# Patient Record
Sex: FEMALE | Race: BLACK OR AFRICAN AMERICAN | Employment: OTHER | URBAN - METROPOLITAN AREA
[De-identification: names, ages, dates, MRNs, and addresses within clinical notes are randomized per-mention and may not be internally consistent; named-entity substitution may affect disease eponyms.]

---

## 2018-08-11 ENCOUNTER — HOSPITAL ENCOUNTER (OUTPATIENT)
Dept: MAMMOGRAPHY | Age: 68
Discharge: HOME | End: 2018-08-13
Payer: COMMERCIAL

## 2018-08-11 ENCOUNTER — HOSPITAL ENCOUNTER (OUTPATIENT)
Dept: ULTRASOUND IMAGING | Age: 68
Discharge: HOME | End: 2018-08-13
Payer: COMMERCIAL

## 2018-08-11 DIAGNOSIS — Z13.820 SCREENING FOR OSTEOPOROSIS: ICD-10-CM

## 2018-08-11 DIAGNOSIS — N95.0 POSTMENOPAUSAL BLEEDING: ICD-10-CM

## 2018-08-11 DIAGNOSIS — Z12.31 VISIT FOR SCREENING MAMMOGRAM: ICD-10-CM

## 2018-08-11 PROCEDURE — 76856 US EXAM PELVIC COMPLETE: CPT

## 2018-08-11 PROCEDURE — 77067 SCR MAMMO BI INCL CAD: CPT

## 2018-08-11 PROCEDURE — 77080 DXA BONE DENSITY AXIAL: CPT

## 2023-02-03 ENCOUNTER — HOSPITAL ENCOUNTER (OUTPATIENT)
Dept: ULTRASOUND IMAGING | Age: 73
End: 2023-02-03
Payer: MEDICARE

## 2023-02-03 DIAGNOSIS — N85.2 HYPERTROPHY OF UTERUS: ICD-10-CM

## 2023-02-03 PROCEDURE — 76830 TRANSVAGINAL US NON-OB: CPT

## 2023-05-22 ENCOUNTER — ANESTHESIA EVENT (OUTPATIENT)
Dept: OPERATING ROOM | Age: 73
End: 2023-05-22
Payer: MEDICARE

## 2023-05-22 ENCOUNTER — ANESTHESIA (OUTPATIENT)
Dept: OPERATING ROOM | Age: 73
End: 2023-05-22
Payer: MEDICARE

## 2023-05-22 ENCOUNTER — HOSPITAL ENCOUNTER (OUTPATIENT)
Age: 73
Setting detail: OUTPATIENT SURGERY
Discharge: HOME | End: 2023-05-22
Attending: SURGERY | Admitting: SURGERY
Payer: MEDICARE

## 2023-05-22 VITALS
HEART RATE: 56 BPM | DIASTOLIC BLOOD PRESSURE: 73 MMHG | HEIGHT: 63 IN | BODY MASS INDEX: 47.13 KG/M2 | OXYGEN SATURATION: 97 % | WEIGHT: 266 LBS | TEMPERATURE: 97.5 F | SYSTOLIC BLOOD PRESSURE: 161 MMHG | RESPIRATION RATE: 16 BRPM

## 2023-05-22 DIAGNOSIS — Z86.010 HX OF COLONIC POLYPS: ICD-10-CM

## 2023-05-22 DIAGNOSIS — Z12.11 SCREENING FOR COLON CANCER: ICD-10-CM

## 2023-05-22 PROCEDURE — 2500000003 HC RX 250 WO HCPCS: Performed by: NURSE ANESTHETIST, CERTIFIED REGISTERED

## 2023-05-22 PROCEDURE — 88305 TISSUE EXAM BY PATHOLOGIST: CPT

## 2023-05-22 PROCEDURE — 6360000002 HC RX W HCPCS: Performed by: NURSE ANESTHETIST, CERTIFIED REGISTERED

## 2023-05-22 PROCEDURE — 3609010300 HC COLONOSCOPY W/BIOPSY SINGLE/MULTIPLE: Performed by: SURGERY

## 2023-05-22 PROCEDURE — 3700000000 HC ANESTHESIA ATTENDED CARE: Performed by: SURGERY

## 2023-05-22 PROCEDURE — 3700000001 HC ADD 15 MINUTES (ANESTHESIA): Performed by: SURGERY

## 2023-05-22 PROCEDURE — 2580000003 HC RX 258: Performed by: STUDENT IN AN ORGANIZED HEALTH CARE EDUCATION/TRAINING PROGRAM

## 2023-05-22 PROCEDURE — 7100000011 HC PHASE II RECOVERY - ADDTL 15 MIN: Performed by: SURGERY

## 2023-05-22 PROCEDURE — 7100000010 HC PHASE II RECOVERY - FIRST 15 MIN: Performed by: SURGERY

## 2023-05-22 PROCEDURE — 2709999900 HC NON-CHARGEABLE SUPPLY: Performed by: SURGERY

## 2023-05-22 RX ORDER — SODIUM CHLORIDE, SODIUM LACTATE, POTASSIUM CHLORIDE, CALCIUM CHLORIDE 600; 310; 30; 20 MG/100ML; MG/100ML; MG/100ML; MG/100ML
INJECTION, SOLUTION INTRAVENOUS CONTINUOUS
Status: DISCONTINUED | OUTPATIENT
Start: 2023-05-22 | End: 2023-05-22 | Stop reason: HOSPADM

## 2023-05-22 RX ORDER — SODIUM CHLORIDE 9 MG/ML
INJECTION, SOLUTION INTRAVENOUS PRN
Status: CANCELLED | OUTPATIENT
Start: 2023-05-22

## 2023-05-22 RX ORDER — SODIUM CHLORIDE 0.9 % (FLUSH) 0.9 %
5-40 SYRINGE (ML) INJECTION PRN
Status: CANCELLED | OUTPATIENT
Start: 2023-05-22

## 2023-05-22 RX ORDER — LIDOCAINE HYDROCHLORIDE 20 MG/ML
INJECTION, SOLUTION EPIDURAL; INFILTRATION; INTRACAUDAL; PERINEURAL PRN
Status: DISCONTINUED | OUTPATIENT
Start: 2023-05-22 | End: 2023-05-22 | Stop reason: SDUPTHER

## 2023-05-22 RX ORDER — PROPOFOL 10 MG/ML
INJECTION, EMULSION INTRAVENOUS PRN
Status: DISCONTINUED | OUTPATIENT
Start: 2023-05-22 | End: 2023-05-22 | Stop reason: SDUPTHER

## 2023-05-22 RX ORDER — SODIUM CHLORIDE 0.9 % (FLUSH) 0.9 %
5-40 SYRINGE (ML) INJECTION EVERY 12 HOURS SCHEDULED
Status: CANCELLED | OUTPATIENT
Start: 2023-05-22

## 2023-05-22 RX ADMIN — SODIUM CHLORIDE, POTASSIUM CHLORIDE, SODIUM LACTATE AND CALCIUM CHLORIDE: 600; 310; 30; 20 INJECTION, SOLUTION INTRAVENOUS at 13:37

## 2023-05-22 RX ADMIN — PROPOFOL 75 MCG/KG/MIN: 10 INJECTION, EMULSION INTRAVENOUS at 12:55

## 2023-05-22 RX ADMIN — SODIUM CHLORIDE, POTASSIUM CHLORIDE, SODIUM LACTATE AND CALCIUM CHLORIDE: 600; 310; 30; 20 INJECTION, SOLUTION INTRAVENOUS at 11:54

## 2023-05-22 RX ADMIN — LIDOCAINE HYDROCHLORIDE 50 MG: 20 INJECTION, SOLUTION EPIDURAL; INFILTRATION; INTRACAUDAL; PERINEURAL at 12:55

## 2023-05-22 ASSESSMENT — PAIN - FUNCTIONAL ASSESSMENT: PAIN_FUNCTIONAL_ASSESSMENT: 0-10

## 2023-05-22 NOTE — DISCHARGE INSTRUCTIONS
Colonoscopy: What to Expect at [address] [place] [address]  After you have a colonoscopy, you will stay at the clinic for 1 to 2 hours until the medicines wear off. Then you can go home, but you will need to arrange for a ride. Your doctor will tell you when you can eat and do your other usual activities. Your doctor will talk to you about when you will need your next colonoscopy. The results of your test and your risk for colorectal cancer will help your doctor decide how often you need to be checked. After the test, you may be bloated or have gas pains. You may need to pass gas. If a biopsy was done or a polyp was removed, you may have streaks of blood in your stool (feces) for a few days. This care sheet gives you a general idea about how long it will take for you to recover. But each person recovers at a different pace. Follow the steps below to get better as quickly as possible. How can you care for yourself at home? Activity  Rest as much as you need to after you go home. You should be able to go back to your usual activities the day after the test.  Diet  Follow your doctors directions for eating. Drink plenty of fluids (unless your doctor has told you not to) to replace the fluids that were lost during the colon prep. Do not drink alcohol. Medicines  If polyps were removed or a biopsy was done during the test, your doctor may tell you not to take aspirin or other anti-inflammatory medicines, such as ibuprofen (Advil, Motrin) and naproxen (Aleve), for a few days. Other instructions  For your safety, you should not drive or operate machinery for 24 hours. Do not sign legal documents or make major decisions for 24 hours. Follow-up care is a key part of your treatment and safety. Be sure to make and go to all appointments, and call your doctor if you are having problems. It's also a good idea to know your test results and keep a list of the medicines you take. When should you call for help?   Call [phone]

## 2023-05-22 NOTE — BRIEF OP NOTE
Brief Postoperative Note      Patient: Pratibha Romero  YOB: 1950  MRN: [id number]    Date of Procedure: 5/22/2023    Pre-Op Diagnosis Codes:     * Screening for colon cancer [Z12.11]     * Hx of colonic polyps [Z86.010]    Post-Op Diagnosis:  colon polyps; diverticulosis; hemorrhoids       Procedure(s):  COLONOSCOPY WITH BIOPSY    Surgeon(s):  Ayad Lott MD    Assistant:  * No surgical staff found *    Anesthesia: Monitor Anesthesia Care    Estimated Blood Loss (mL): Minimal    Complications: None    Specimens:   ID Type Source Tests Collected by Time Destination   A : Biopsy Polyp Ascending Colon Tissue Colon-Ascending Imani Wilson MD 5/22/2023 1311    B : Cecal Biopsy Tissue Cecum SURGICAL PATHOLOGY Ayad Lott MD 5/22/2023 1322    C : Biopsy Ascending Colon polyp #2 Tissue Colon-Ascending SURGICAL PATHOLOGY Ayad Lott MD 5/22/2023 1325    D : Polyp at 72 cm Tissue [address] Martínez MD 5/22/2023 1330        Implants:  * No implants in log *      Drains: * No LDAs found *    Findings: colon polyps in ascending colon and at 65 cms; redundant colon; diverticulosis and hemorrhoids      Electronically signed by Ayad Lott MD on 5/22/2023 at 1:50 PM

## 2023-05-22 NOTE — ANESTHESIA POSTPROCEDURE EVALUATION
Department of Anesthesiology  Postprocedure Note    Patient: Reji Jones  MRN: [id number]  YOB: 1950  Date of evaluation: 5/22/2023      Procedure Summary     Date: 05/22/23 Room / Location: [place] [id number] / [facility] - INPATIENT    Anesthesia Start: 1250 Anesthesia Stop: 1344    Procedure: COLONOSCOPY WITH BIOPSY Diagnosis:       Screening for colon cancer      Hx of colonic polyps      (Screening for colon cancer [Z12.11])      (Hx of colonic polyps [Z86.010])    Surgeons: Yan Evans MD Responsible Provider: Faiza Ortiz MD    Anesthesia Type: MAC ASA Status: 3          Anesthesia Type: No value filed. Marzena Phase I: Marzena Score: 10    Marzena Phase II: Marzena Score: 4      Anesthesia Post Evaluation    Patient location during evaluation: PACU  Patient participation: complete - patient participated  Level of consciousness: awake  Airway patency: patent  Nausea & Vomiting: no nausea and no vomiting  Complications: no  Cardiovascular status: hemodynamically stable  Respiratory status: acceptable  Hydration status: stable  There was medical reason for not using a multimodal analgesia pain management approach.

## 2023-05-22 NOTE — ANESTHESIA PRE PROCEDURE
Department of Anesthesiology  Preprocedure Note       Name:  Jeramie Wilkes   Age:  71 y.o.  :  1950                                          MRN:  [id number]         Date:  2023      Surgeon: Ted Lopez):  Abelardo Obregon MD    Procedure: Procedure(s):  COLORECTAL CANCER SCREENING, NOT HIGH RISK    Medications prior to admission:   Prior to Admission medications    Not on File       Current medications:    Current Facility-Administered Medications   Medication Dose Route Frequency Provider Last Rate Last Admin    lidocaine 1% (buffered) injection 0.5 mL  0.5 mL Infiltration Once Nikita Yeung MD        lactated ringers IV soln infusion   IntraVENous Continuous Nikita Yeung  mL/hr at 23 1154 New Bag at 23 1154       Allergies: Allergies   Allergen Reactions    Latex Other (See Comments)     Latex \" adhesive\"       Problem List:  There is no problem list on file for this patient.       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     Hydatiform degeneration    Rotator cuff injury     Left sided       Past Surgical History:        Procedure Laterality Date    BRAIN MENINGIOMA EXCISION      Patient stated that is what she was told    COLONOSCOPY      POLYPECTOMY         Social History:    Social History     Tobacco Use    Smoking status: Never    Smokeless tobacco: Never   Substance Use Topics    Alcohol use: Yes     Comment: Seldom                                Counseling given: Not Answered      Vital Signs (Current):   Vitals:    23 1048 23 1049 23 1055   BP:  (!) 153/83    Pulse:  81    Resp:  16    Temp:  97.2 °F (36.2 °C)    TempSrc:   Temporal   SpO2:  97%    Weight: 220 lb (99.8 kg)  266 lb (120.7 kg)   Height: 5' 3\" (1.6 m)                                                BP Readings from Last 3 Encounters:

## 2023-05-23 NOTE — OP NOTE
[id number] [facility] [address]                [address] [place] [address]                                OPERATIVE REPORT    PATIENT NAME: Venita Pate                     :        1950  MED REC NO:   [id number]                             ROOM:  ACCOUNT NO:   [de-identified]                           ADMIT DATE: 2023  PROVIDER:     Carmen Pope    DATE OF PROCEDURE:  2023    PREOPERATIVE DIAGNOSIS:  Screening for colon cancer. POSTOPERATIVE DIAGNOSIS:  Screening for colon cancer plus colon polyps  plus diverticulosis in the sigmoid colon and hemorrhoids. OPERATION PERFORMED:  Colonoscopy to the cecum with random cecal  biopsies followed by polypectomies x2 in the ascending colon and then  once in the descending colon. ENDOSCOPIST:  Carmen Pope    ANESTHESIA:  MAC.    INDICATIONS:  This is a 67-year-old [place] American lady seen in my  office indicating that her sister is dead, her sister was recently  diagnosed with colon cancer and she desired to have screening  colonoscopy. FINDINGS AND DESCRIPTION OF PROCEDURE:  The patient was brought to the  endoscopy suite after undergoing Suprep preparation for the procedure  and placed in a left lateral decubitus position and given propofol for  deep sedation. We were able to pass a flexible variable stiffness scope  without difficulty through the anus to the cecum identified by the  ileocecal valve and other normal anatomical markers. Random biopsies  were taken from the cecum. Careful withdrawal of the scope showed a  polypoid abnormality and long folds at two separate places in the  ascending colon and these were removed using hot biopsy forceps. Further withdrawal of the scope showed a 65 cm another sessile polypoid  abnormality that was removed using hot biopsy forceps.  _____  retroflexion of the scope in the rectal vault showed hemorrhoids in  anorectal junction.   No other major

## 2023-05-24 LAB — SURGICAL PATHOLOGY REPORT: NORMAL

## 2023-07-21 ENCOUNTER — HOSPITAL ENCOUNTER (OUTPATIENT)
Dept: MAMMOGRAPHY | Age: 73
End: 2023-07-21
Payer: MEDICARE

## 2023-07-21 DIAGNOSIS — M81.0 AGE-RELATED OSTEOPOROSIS WITHOUT CURRENT PATHOLOGICAL FRACTURE: ICD-10-CM

## 2023-07-21 PROCEDURE — 77080 DXA BONE DENSITY AXIAL: CPT

## (undated) DEVICE — FORCEP BX MESH TOOTH MIC 2.8 MMX240 CM NDL STRL RADIAL JAW 4

## (undated) DEVICE — TRAP SURG QUAD PARABOLA SLOT DSGN SFTY SCRN TRAPEASE

## (undated) DEVICE — SYRINGE MED 50ML LUERLOCK TIP

## (undated) DEVICE — ELECTRODE PT RET AD L9FT HI MOIST COND ADH HYDRGEL CORDED

## (undated) DEVICE — FORCEPS BX L L240CM DIA2.4MM RAD JAW 4 HOT FOR POLYP DISP

## (undated) DEVICE — GOWN,AURORA,NONREINFORCED,LARGE: Brand: MEDLINE

## (undated) DEVICE — MEDICINE CUP, GRADUATED, STER: Brand: MEDLINE